# Patient Record
Sex: FEMALE | Race: WHITE
[De-identification: names, ages, dates, MRNs, and addresses within clinical notes are randomized per-mention and may not be internally consistent; named-entity substitution may affect disease eponyms.]

---

## 2018-03-13 ENCOUNTER — HOSPITAL ENCOUNTER (EMERGENCY)
Dept: HOSPITAL 76 - ED | Age: 38
LOS: 1 days | Discharge: HOME | End: 2018-03-14
Payer: MEDICAID

## 2018-03-13 DIAGNOSIS — L08.9: ICD-10-CM

## 2018-03-13 DIAGNOSIS — F17.200: ICD-10-CM

## 2018-03-13 DIAGNOSIS — L72.3: Primary | ICD-10-CM

## 2018-03-13 PROCEDURE — 11400 EXC TR-EXT B9+MARG 0.5 CM<: CPT

## 2018-03-13 PROCEDURE — 99283 EMERGENCY DEPT VISIT LOW MDM: CPT

## 2018-03-13 NOTE — ED PHYSICIAN DOCUMENTATION
PD HPI SKIN





- Stated complaint


Stated Complaint: BUMP ON BACK





- Chief complaint


Chief Complaint: Wound





- History obtained from


History obtained from: Patient





- History of Present Illness


Timing - onset: How many weeks ago (1)


Timing - duration: Weeks (1)


Timing - details: Gradual onset (she has had a small lump/bump under the skin 

by feel and it has just now started to be tender and red the past week.)


Location: Back (left lateral mid thoracic area overlying ribs area but has 

rounded firm, tender, red cyst lesion under skin of posterior left chest. The 

bump is freely moveable from underlying muscle/ribs. No drainage. has local 

redness and tenderness. The rounded lump is 1.1 cm diameer.)





Review of Systems


Constitutional: denies: Fever, Chills





PD PAST MEDICAL HISTORY





- Past Medical History


Past Medical History: No


Respiratory: None


Derm: None





- Past Surgical History


Past Surgical History: Yes


/GYN:  section, Tubal ligation





- Present Medications


Home Medications: 


 Ambulatory Orders











 Medication  Instructions  Recorded  Confirmed


 


Hydrocodone/Acetaminophen 1 - 2 each PO Q6H PRN #15 tablet 14 





[Hydrocodon-Acetaminophen 5-325]   


 


Ibuprofen [Advil] PRN 14


 


Sulfamethox/Trimeth 800/160 1 each PO BID #10 tablet 18 





[Bactrim Ds 800/160]   














- Allergies


Allergies/Adverse Reactions: 


 Allergies











Allergy/AdvReac Type Severity Reaction Status Date / Time


 


No Known Drug Allergies Allergy   Verified 18 21:41














- Social History


Does the pt smoke?: Yes


Smoking Status: Current every day smoker


Does the pt drink ETOH?: Yes


Does the pt have substance abuse?: No





- Immunizations


Immunizations are current?: Yes





- POLST


Patient has POLST: No





PD ED PE NORMAL





- Vitals


Vital signs reviewed: Yes





- General


General: Alert and oriented X 3, No acute distress, Well developed/nourished





- HEENT


HEENT: Pharynx benign





- Neck


Neck: Supple, no meningeal sign, No adenopathy





- Cardiac


Cardiac: RRR, No murmur





- Respiratory


Respiratory: Clear bilaterally





- Derm


Derm: Other (left lateral mid thoracic area with 1.5 cm roudned somewhat tender

, locally red lesion c/w cyst that is now infected. )





Results





- Vitals


Vitals: 


 Oxygen











O2 Source                      Room air

















Procedures





- Abscess I&D (location)


  ** left thoracic back


Preparation: Lidocaine 1%, With epi


Incision: Incised with scalpel, Purulent drainage, Irrigated, Other (The area 

seemed c/w cyst with infection, and it was well demarcated, so I excised the 

cyst after initial I&D with small scalpel. Used blunt dissection to free it up 

and remove enmasse for it.).  No: Culture obtained





PD MEDICAL DECISION MAKING





- ED course


Complexity details: considered differential (infected feels like sebaceous cyst

, and the sac of it easily demarcated under skin. I opted to incise and drain 

and remove the cyst sac as well as open the infection. ), d/w patient





Departure





- Departure


Disposition: 01 Home, Self Care


Clinical Impression: 


 Infected sebaceous cyst of skin





Condition: Stable


Record reviewed to determine appropriate education?: Yes


Instructions:  ED Abscess IandD


Prescriptions: 


Sulfamethox/Trimeth 800/160 [Bactrim Ds 800/160] 1 each PO BID #10 tablet


Comments: 


It is okay to wash and shower the area.  Apply antibiotic ointment 2-3 times a 

day.  Bactrim twice daily for residual infection of the skin around there.  I 

did remove the cyst sac believe entirely and so there should not be a 

recurrence of this.  Suture removal in about 7 or 8 days.  Recheck if signs of 

infection of infection persist beyond a few more days.  Tylenol or ibuprofen if 

needed for pains.


Discharge Date/Time: 18 00:15

## 2018-03-14 VITALS — SYSTOLIC BLOOD PRESSURE: 127 MMHG | DIASTOLIC BLOOD PRESSURE: 94 MMHG

## 2018-05-07 ENCOUNTER — HOSPITAL ENCOUNTER (OUTPATIENT)
Dept: HOSPITAL 76 - SDS | Age: 38
Discharge: HOME | End: 2018-05-07
Attending: SURGERY
Payer: MEDICAID

## 2018-05-07 VITALS — DIASTOLIC BLOOD PRESSURE: 68 MMHG | SYSTOLIC BLOOD PRESSURE: 127 MMHG

## 2018-05-07 DIAGNOSIS — F17.210: ICD-10-CM

## 2018-05-07 DIAGNOSIS — K42.0: Primary | ICD-10-CM

## 2018-05-07 PROCEDURE — 49587: CPT

## 2018-05-07 PROCEDURE — 0WUF0JZ SUPPLEMENT ABDOMINAL WALL WITH SYNTHETIC SUBSTITUTE, OPEN APPROACH: ICD-10-PCS | Performed by: SURGERY

## 2018-05-07 NOTE — OPERATIVE REPORT
DATE OF SERVICE: 05/07/2018

Physician: Harshad Knapp MD

 

PREOPERATIVE DIAGNOSIS:  Symptomatic chronically incarcerated incisional 
umbilical hernia.

 

POSTOPERATIVE DIAGNOSIS:  Symptomatic chronically incarcerated incisional 
umbilical hernia.

 

PROCEDURE PERFORMED:  Open repair of same with Ventralex soft tissue patch.

 

INDICATIONS:  The patient is a 37-year-old man with an increasingly painful and 
progressively 

enlarging supraumbilical bulge that is unable to be completely reduced.  She is 
status post 

prior laparoscopy and evaluation revealed an umbilical incisional hernia that 
was chronically 

partially incarcerated.  She was advised to undergo open repair with mesh.

 

TECHNIQUE:  After informed consent, the patient was taken to the operating room 
where she was 

placed under general endotracheal anesthesia.  Preoperative preparation 
included application of

sequential calf compression boots and 2 grams of cefazolin intravenously within 
an hour of the 

incision.  The abdomen was prepared with ChloraPrep solution and draped in the 
usual sterile 

fashion.  Marcaine 0.5% with epinephrine was used to assist in postoperative 
analgesia with 

local infiltration, approximately 30 mL of the mixture was used.  A curvilinear 
transverse 

supraumbilical incision was made approximately 5 cm in length and carried down 
through 

subcutaneous tissues until the hernia sac was identified and was dissected free 
from the 

overlying umbilical dermis and the surrounding soft tissues until the anterior 
fascia could be 

identified and exposed circumferentially.  Hemostasis achieved with 
electrocautery and 2-0 

Vicryl ties.  The hernia sac was approximately 5-6 cm in diameter and the 
fascial defect 

approximately 2.5 cm in diameter.  The hernia sac was entered and adhesions of 
the omentum to 

the hernia sac were lysed.  The hernia sac was excised completely and 
discarded.  The omentum 

could not be reduced through the fascial defect and so it was amputated using 
curved hemostats 

and 2-0 Vicryl ties for hemostasis and was discarded.  The remnant of the 
omentum could then be

reduced into the peritoneal cavity.  The fascial edges were mobilized 
circumferentially and 

intra-abdominal adhesions around the incision were lysed.  A 4 cm diameter 
Ventralex soft 

tissue hernia patch was then soaked in antibiotic solution containing 1 gram of 
cefazolin per 

liter, brought onto the field, and placed in an intraperitoneal subfascial 
position with the 

coated inner surface facing the peritoneal cavity and the bare polypropylene 
surface facing exteriorly.  The

fascial defect was then reapproximated over the mesh incorporating the mesh 
itself in the 

suture line to anchor it in place.  The tails of the straps for positioning the 
mesh were 

excised and discarded.  The fascial edges were reapproximated using continuous 
and interrupted 

0 Ethibond sutures.  After hemostasis been assured, the wound was copiously 
irrigated with 

saline solution containing the antibiotic solution following which wound 
closure was 

accomplished in layers using 2-0 Vicryl to reapproximate the umbilical dermis 
to the anterior 

fascia and to reapproximate the deep subcutaneous tissues, followed by 
continuous 3-0 Vicryl 

for the superficial subcutaneous tissue, followed by 4-0 Monocryl and 
Dermabond.  Anesthesia 

was terminated and the patient transferred to the recovery room in satisfactory 
condition.  

Sponge and needle counts were correct x2.  No drains were used.

 

 

cc: WINSOME Mercado

DD: 05/07/2018 09:25

TD: 05/07/2018 10:17

Job #: [id number]

MTDD